# Patient Record
Sex: MALE | Race: WHITE | URBAN - METROPOLITAN AREA
[De-identification: names, ages, dates, MRNs, and addresses within clinical notes are randomized per-mention and may not be internally consistent; named-entity substitution may affect disease eponyms.]

---

## 2017-01-05 LAB — HBA1C MFR BLD HPLC: 5.6 %

## 2017-01-06 ENCOUNTER — OFFICE VISIT (OUTPATIENT)
Dept: ENDOCRINOLOGY | Age: 67
End: 2017-01-06

## 2017-01-06 VITALS
WEIGHT: 268 LBS | HEART RATE: 89 BPM | TEMPERATURE: 98.7 F | RESPIRATION RATE: 16 BRPM | SYSTOLIC BLOOD PRESSURE: 132 MMHG | BODY MASS INDEX: 32.63 KG/M2 | OXYGEN SATURATION: 95 % | DIASTOLIC BLOOD PRESSURE: 72 MMHG | HEIGHT: 76 IN

## 2017-01-06 DIAGNOSIS — I10 ESSENTIAL HYPERTENSION: ICD-10-CM

## 2017-01-06 DIAGNOSIS — G62.9 NEUROPATHY: Primary | ICD-10-CM

## 2017-01-06 DIAGNOSIS — E11.65 TYPE 2 DIABETES MELLITUS WITH HYPERGLYCEMIA, WITHOUT LONG-TERM CURRENT USE OF INSULIN (HCC): Primary | ICD-10-CM

## 2017-01-06 DIAGNOSIS — E11.42 CONTROLLED TYPE 2 DIABETES MELLITUS WITH DIABETIC POLYNEUROPATHY, WITHOUT LONG-TERM CURRENT USE OF INSULIN (HCC): Primary | ICD-10-CM

## 2017-01-06 DIAGNOSIS — G61.81 CIDP (CHRONIC INFLAMMATORY DEMYELINATING POLYNEUROPATHY) (HCC): ICD-10-CM

## 2017-01-06 DIAGNOSIS — G62.9 NEUROPATHY: ICD-10-CM

## 2017-01-06 LAB — GLUCOSE POC: 121 MG/DL

## 2017-01-06 RX ORDER — ALCOHOL 2.38 KG/3.79L
1 GEL TOPICAL 2 TIMES DAILY
Qty: 180 CAP | Refills: 3 | Status: SHIPPED | OUTPATIENT
Start: 2017-01-06 | End: 2019-11-04

## 2017-01-06 RX ORDER — GABAPENTIN 300 MG/1
CAPSULE ORAL
Qty: 450 CAP | Refills: 3 | Status: SHIPPED | OUTPATIENT
Start: 2017-01-06 | End: 2018-01-08 | Stop reason: SDUPTHER

## 2017-01-06 NOTE — PROGRESS NOTES
[facility]               Juhi Carpio MD        [address] 78 [phone] 66 Fax [phone]  MLT-YND) [address] [address] [address] ZZ:[id number] Fax [phone] ( Monday)          Patient Information  Date:1/7/2017  Name : Rochelle Yeung 61 y.o.     YOB: 1950             Chief Complaint   Patient presents with    Diabetes       History of Present Illness: Rochelle Yeung is a 61 y.o. male here for  Fu  of  Type 2 Diabetes Mellitus. He wants to lose weight and try Trulicity   Eating right - on high protein     Diabetes. He is on Bydureon. Metformin    Has not noticed significant hypoglycemia     Compliant with medications      Self-referred for evaluation of diabetes and neuropathy. He was diagnosed with diabetes six years ago. Had  Uncontrolled DM in the past .  In February 2015, he noticed sudden muscle weakness, muscle pain and inability to move. He was on statins at that time which was discontinued and according to the notes from Dr. Layne Wing, he had noted some improvement in the muscle weakness and aches. However, patient states there was no difference after discontinuing statins. He had extensive workup including autoimmune etiology, SPEP, TSH, B-12, connective tissue disorders which were all negative. M-spike was elevated, evaluated by Dr. Shona Clemons and repeat protein electrophoresis was negative, bone scan did not show any lytic lesions. He continues to have muscle fatigue and weakness, pain in the feet. no back ache  Characterization of the symptoms are less specific. The symptoms worsen after a long day of work and after exertion. No connective tissue disorders. Prior to February 2015, he says he was doing fine . No viral infection, no immunization. No upper extremity symptoms but he occasionally has some pain and numbness. He was told he has carpal tunnel syndrome.         Wt Readings from Last 3 Encounters:   01/06/17 268 lb (121.6 kg)   10/05/16 269 lb (122 kg)   05/06/16 266 lb 9.6 oz (120.9 kg)       BP Readings from Last 3 Encounters:   01/06/17 132/72   10/05/16 (!) 136/93   05/06/16 118/72           Past Medical History   Diagnosis Date    CIDP (chronic inflammatory demyelinating polyneuropathy) (HCC)     Diabetes (HCC)     Hypercholesteremia     Hypertension      Current Outpatient Prescriptions   Medication Sig    valsartan (DIOVAN) 320 mg tablet TAKE 1 TAB BY MOUTH DAILY.  metFORMIN (GLUCOPHAGE) 1,000 mg tablet TAKE 1 TABLET BY MOUTH TWICE DAILY    dulaglutide (TRULICITY) 1.5 SG/1.6 mL sub-q pen 0.5 mL by SubCUTAneous route every seven (7) days. Stop Bydureon.  gabapentin (NEURONTIN) 300 mg capsule Take 2 caps in the AM, 1 cap at noon and 2 caps in the evening.  levomefolate-B6-meB12-algal oil (METANX, ALGAL OIL,) 3 mg-35 mg-2 mg -90.314 mg cap Take 1 Cap by mouth two (2) times a day. No current facility-administered medications for this visit. No Known Allergies      Review of Systems:  - Constitutional Symptoms: no fevers, no chills  - Eyes: no blurry vision no double vision  - Cardiovascular: no chest pain ,no palpitations  - Respiratory: no cough no shortness of breath  - Gastrointestinal: no dysphagia no  abdominal pain  - Musculoskeletal:+ joint pains + weakness  - Integumentary: no rashes  - Neurological:+ numbness, tingling, no  headaches  -     Physical Examination:   Blood pressure 132/72, pulse 89, temperature 98.7 °F (37.1 °C), temperature source Oral, resp. rate 16, height 6' 4\" (1.93 m), weight 268 lb (121.6 kg), SpO2 95 %. Estimated body mass index is 32.62 kg/(m^2) as calculated from the following:    Height as of this encounter: 6' 4\" (1.93 m). -   Weight as of this encounter: 268 lb (121.6 kg).   - General: pleasant, no distress, good eye contact  - HEENT: no pallor, no periorbital edema, EOMI  - Neck: supple, no thyromegaly, no nodules  - Cardiovascular: regular, normal rate, normal S1 and S2,  - Respiratory: clear to auscultation bilaterally  - Gastrointestinal: soft, nontender, nondistended,  BS +  - Musculoskeletal: no proximal weakness  - Psychiatric: normal mood and affect  - Skin: color, texture, turgor normal.       Data Reviewed:     [] Glucose records reviewed. [] See glucose records for details (to be scanned). [] A1C  [] Reviewed labs    Lab Results   Component Value Date/Time    Hemoglobin A1c 5.9 10/05/2016 01:32 PM    Glucose 110 10/05/2016 01:32 PM    Glucose  01/06/2017 11:55 AM    Microalb/Creat ratio (ug/mg creat.) 8.4 10/05/2016 01:32 PM    LDL, calculated 123 10/05/2016 01:32 PM    Creatinine 0.72 10/05/2016 01:32 PM    Hemoglobin A1c, External 5.6 01/05/2017      Lab Results   Component Value Date/Time    Cholesterol, total 199 10/05/2016 01:32 PM    HDL Cholesterol 38 10/05/2016 01:32 PM    LDL, calculated 123 10/05/2016 01:32 PM    Triglyceride 189 10/05/2016 01:32 PM         Lab Results   Component Value Date/Time    ALT 27 10/05/2016 01:32 PM    AST 23 10/05/2016 01:32 PM    Alk. phosphatase 54 10/05/2016 01:32 PM    Bilirubin, total 0.4 10/05/2016 01:32 PM     Lab Results   Component Value Date/Time    GFR est  10/05/2016 01:32 PM    GFR est non-AA 99 10/05/2016 01:32 PM    Creatinine 0.72 10/05/2016 01:32 PM    BUN 14 10/05/2016 01:32 PM    Sodium 142 10/05/2016 01:32 PM    Potassium 4.9 10/05/2016 01:32 PM    Chloride 100 10/05/2016 01:32 PM    CO2 25 10/05/2016 01:32 PM      Lab Results   Component Value Date/Time    TSH 1.180 03/13/2015 10:11 AM        Assessment/Plan:     1. Controlled type 2 diabetes mellitus with diabetic polyneuropathy, without long-term current use of insulin ([address]    2. Essential hypertension    3. CIDP (chronic inflammatory demyelinating polyneuropathy) ([address]        1.  Type 2 Diabetes Mellitus with neuropathy  Lab Results   Component Value Date/Time    Hemoglobin A1c 5.9 10/05/2016 01:32 PM    Hemoglobin A1c (POC) 5.3 05/06/2016 02:16 PM    Hemoglobin A1c, External 5.6 01/05/2017     Continue Metformin   Trulicity        FLU annually ,Pneumovax ,aspirin daily,annual eye exam,microalbumin    2. HTN : Continue current therapy     3. Hyperlipidemia : intolerant to statin. ASA    4.Obesity:Body mass index is 32.62 kg/(m^2). Discussed about the importance of exercise and carbohydrate portion control. 5. CIDP - On IV IG   Has M spike and  mild hypercalcemia, bone survey negative  Bone marrow bx was neg     Neuropathy - gabapentin, Metanx       There are no Patient Instructions on file for this visit. Follow-up Disposition:  Return in about 2 months (around 3/6/2017). Thank you for allowing me to participate in the care of this patient.     Yelena Poole MD

## 2017-01-06 NOTE — PROGRESS NOTES
Eye exam: within last year  Foot exam: not within last year    Lab Results   Component Value Date/Time    Hemoglobin A1c 5.9 10/05/2016 01:32 PM    Hemoglobin A1c (POC) 5.3 05/06/2016 02:16 PM     Wt Readings from Last 3 Encounters:   01/06/17 268 lb (121.6 kg)   10/05/16 269 lb (122 kg)   05/06/16 266 lb 9.6 oz (120.9 kg)     Temp Readings from Last 3 Encounters:   01/06/17 98.7 °F (37.1 °C) (Oral)   10/05/16 96.7 °F (35.9 °C) (Oral)   12/04/15 96 °F (35.6 °C) (Oral)     BP Readings from Last 3 Encounters:   01/06/17 132/72   10/05/16 (!) 136/93   05/06/16 118/72     Pulse Readings from Last 3 Encounters:   01/06/17 89   10/05/16 77   05/06/16 88

## 2017-02-15 DIAGNOSIS — I10 ESSENTIAL HYPERTENSION: ICD-10-CM

## 2017-02-16 RX ORDER — METFORMIN HYDROCHLORIDE 1000 MG/1
TABLET ORAL
Qty: 180 TAB | Refills: 8 | Status: SHIPPED | OUTPATIENT
Start: 2017-02-16 | End: 2018-03-15 | Stop reason: SDUPTHER

## 2017-08-04 DIAGNOSIS — E11.65 TYPE 2 DIABETES MELLITUS WITH HYPERGLYCEMIA, WITHOUT LONG-TERM CURRENT USE OF INSULIN (HCC): ICD-10-CM

## 2017-08-07 ENCOUNTER — OFFICE VISIT (OUTPATIENT)
Dept: ENDOCRINOLOGY | Age: 67
End: 2017-08-07

## 2017-08-07 VITALS
HEIGHT: 76 IN | RESPIRATION RATE: 16 BRPM | BODY MASS INDEX: 32.51 KG/M2 | WEIGHT: 267 LBS | SYSTOLIC BLOOD PRESSURE: 140 MMHG | DIASTOLIC BLOOD PRESSURE: 80 MMHG | OXYGEN SATURATION: 93 %

## 2017-08-07 DIAGNOSIS — G61.81 CIDP (CHRONIC INFLAMMATORY DEMYELINATING POLYNEUROPATHY) (HCC): ICD-10-CM

## 2017-08-07 DIAGNOSIS — E11.42 CONTROLLED TYPE 2 DIABETES MELLITUS WITH DIABETIC POLYNEUROPATHY, WITHOUT LONG-TERM CURRENT USE OF INSULIN (HCC): Primary | ICD-10-CM

## 2017-08-07 DIAGNOSIS — I10 ESSENTIAL HYPERTENSION: ICD-10-CM

## 2017-08-07 NOTE — PROGRESS NOTES
Edgar Cano MD           Patient Information  Date:8/7/2017  Name : Kamila Madden 74 y.o.     YOB: 1950             Chief Complaint   Patient presents with    Diabetes       History of Present Illness: Kamila Madden is a 74 y.o. male here for  Fu  of  Type 2 Diabetes Mellitus. He wants to lose weight and try Trulicity   Tried low carb diet , lost weight and gained it back   No exercise   Has muscle cramps    Diabetes. He is on trulicity,  Metformin    Has not noticed significant hypoglycemia     Compliant with medications      Self-referred for evaluation of diabetes and neuropathy. He was diagnosed with diabetes six years ago. Had  Uncontrolled DM in the past .  In February 2015, he noticed sudden muscle weakness, muscle pain and inability to move. He was on statins at that time which was discontinued and according to the notes from Dr. Mann Quinn, he had noted some improvement in the muscle weakness and aches. However, patient states there was no difference after discontinuing statins. He had extensive workup including autoimmune etiology, SPEP, TSH, B-12, connective tissue disorders which were all negative. M-spike was elevated, evaluated by Dr. Ashanti Griffin and repeat protein electrophoresis was negative, bone scan did not show any lytic lesions. He continues to have muscle fatigue and weakness, pain in the feet. no back ache  Characterization of the symptoms are less specific. The symptoms worsen after a long day of work and after exertion. No connective tissue disorders. Prior to February 2015, he says he was doing fine . No viral infection, no immunization. No upper extremity symptoms but he occasionally has some pain and numbness. He was told he has carpal tunnel syndrome.         Wt Readings from Last 3 Encounters:   08/07/17 267 lb (121.1 kg)   01/06/17 268 lb (121.6 kg)   10/05/16 269 lb (122 kg)       BP Readings from Last 3 Encounters:   08/07/17 140/80   01/06/17 132/72   10/05/16 (!) 136/93           Past Medical History:   Diagnosis Date    CIDP (chronic inflammatory demyelinating polyneuropathy) (HCC)     Diabetes (HCC)     Hypercholesteremia     Hypertension      Current Outpatient Prescriptions   Medication Sig    dulaglutide (TRULICITY) 1.5 HV/3.8 mL sub-q pen 0.5 mL by SubCUTAneous route every seven (7) days. Stop Bydureon.  metFORMIN (GLUCOPHAGE) 1,000 mg tablet TAKE 1 TABLET BY MOUTH TWICE DAILY    gabapentin (NEURONTIN) 300 mg capsule Take 2 caps in the AM, 1 cap at noon and 2 caps in the evening.  levomefolate-B6-meB12-algal oil (METANX, ALGAL OIL,) 3 mg-35 mg-2 mg -90.314 mg cap Take 1 Cap by mouth two (2) times a day.  valsartan (DIOVAN) 320 mg tablet TAKE 1 TAB BY MOUTH DAILY.  metFORMIN (GLUCOPHAGE) 1,000 mg tablet TAKE 1 TABLET BY MOUTH TWICE DAILY     No current facility-administered medications for this visit. No Known Allergies      Review of Systems:  - Constitutional Symptoms: no fevers, no chills  - Eyes: no blurry vision no double vision  - Cardiovascular: no chest pain ,no palpitations  - Respiratory: no cough no shortness of breath  - Gastrointestinal: no dysphagia no  abdominal pain  - Musculoskeletal:+ joint pains + weakness  - Integumentary: no rashes  - Neurological:+ numbness, tingling, no  headaches  -     Physical Examination:   Blood pressure 140/80, resp. rate 16, height 6' 4\" (1.93 m), weight 267 lb (121.1 kg), SpO2 93 %. Estimated body mass index is 32.5 kg/(m^2) as calculated from the following:    Height as of this encounter: 6' 4\" (1.93 m). -   Weight as of this encounter: 267 lb (121.1 kg).   - General: pleasant, no distress, good eye contact  - HEENT: no pallor, no periorbital edema, EOMI  - Neck: supple, no thyromegaly, no nodules  - Cardiovascular: regular, normal rate, normal S1 and S2,  - Respiratory: clear to auscultation bilaterally  - Gastrointestinal: soft, nontender, nondistended,  BS +  - Musculoskeletal: no proximal weakness  - Psychiatric: normal mood and affect  - Skin: color, texture, turgor normal.       Data Reviewed:     [] Glucose records reviewed. [] See glucose records for details (to be scanned). [] A1C  [] Reviewed labs    Lab Results   Component Value Date/Time    Hemoglobin A1c 5.9 10/05/2016 01:32 PM    Glucose 110 10/05/2016 01:32 PM    Glucose  01/06/2017 11:55 AM    Microalb/Creat ratio (ug/mg creat.) 8.4 10/05/2016 01:32 PM    LDL, calculated 123 10/05/2016 01:32 PM    Creatinine 0.72 10/05/2016 01:32 PM    Hemoglobin A1c, External 5.6 01/05/2017      Lab Results   Component Value Date/Time    Cholesterol, total 199 10/05/2016 01:32 PM    HDL Cholesterol 38 10/05/2016 01:32 PM    LDL, calculated 123 10/05/2016 01:32 PM    Triglyceride 189 10/05/2016 01:32 PM         Lab Results   Component Value Date/Time    ALT (SGPT) 27 10/05/2016 01:32 PM    AST (SGOT) 23 10/05/2016 01:32 PM    Alk. phosphatase 54 10/05/2016 01:32 PM    Bilirubin, total 0.4 10/05/2016 01:32 PM     Lab Results   Component Value Date/Time    GFR est  10/05/2016 01:32 PM    GFR est non-AA 99 10/05/2016 01:32 PM    Creatinine 0.72 10/05/2016 01:32 PM    BUN 14 10/05/2016 01:32 PM    Sodium 142 10/05/2016 01:32 PM    Potassium 4.9 10/05/2016 01:32 PM    Chloride 100 10/05/2016 01:32 PM    CO2 25 10/05/2016 01:32 PM      Lab Results   Component Value Date/Time    TSH 1.180 03/13/2015 10:11 AM        Assessment/Plan:     No diagnosis found. 1. Type 2 Diabetes Mellitus with neuropathy  Lab Results   Component Value Date/Time    Hemoglobin A1c 5.9 10/05/2016 01:32 PM    Hemoglobin A1c (POC) 5.3 05/06/2016 02:16 PM    Hemoglobin A1c, External 5.6 01/05/2017     Continue Metformin   Trulicity    Need low carb diet and lose weight     FLU annually ,Pneumovax ,aspirin daily,annual eye exam,microalbumin    2. HTN : Continue current therapy     3.  Hyperlipidemia : intolerant to statin. ASA    4.Obesity:Body mass index is 32.5 kg/(m^2). Discussed about the importance of exercise and carbohydrate portion control. 5. CIDP - On IV IG   Has M spike and  mild hypercalcemia, bone survey negative  Bone marrow bx was neg     6 Neuropathy - gabapentin, Metanx, cramps related CIDP ? Has tried K , mag, calcium , Vit D   History not typical of PAD, discussed about ABAD       There are no Patient Instructions on file for this visit. Follow-up Disposition: Not on File    Thank you for allowing me to participate in the care of this patient.     Opal Carrera MD

## 2017-08-07 NOTE — PROGRESS NOTES
Lab Results   Component Value Date/Time    Hemoglobin A1c 5.9 10/05/2016 01:32 PM    Hemoglobin A1c (POC) 5.3 05/06/2016 02:16 PM    Hemoglobin A1c, External 5.6 01/05/2017     Wt Readings from Last 3 Encounters:   08/07/17 267 lb (121.1 kg)   01/06/17 268 lb (121.6 kg)   10/05/16 269 lb (122 kg)     Temp Readings from Last 3 Encounters:   01/06/17 98.7 °F (37.1 °C) (Oral)   10/05/16 96.7 °F (35.9 °C) (Oral)   12/04/15 96 °F (35.6 °C) (Oral)     BP Readings from Last 3 Encounters:   08/07/17 140/80   01/06/17 132/72   10/05/16 (!) 136/93     Pulse Readings from Last 3 Encounters:   01/06/17 89   10/05/16 77   05/06/16 88

## 2017-12-06 RX ORDER — VALSARTAN 320 MG/1
TABLET ORAL
Qty: 90 TAB | Refills: 0 | Status: SHIPPED | OUTPATIENT
Start: 2017-12-06 | End: 2018-03-20 | Stop reason: SDUPTHER

## 2018-03-15 ENCOUNTER — HOSPITAL ENCOUNTER (OUTPATIENT)
Dept: LAB | Age: 68
Discharge: HOME | End: 2018-03-15
Payer: MEDICARE

## 2018-03-15 ENCOUNTER — OFFICE VISIT (OUTPATIENT)
Dept: ENDOCRINOLOGY | Age: 68
End: 2018-03-15

## 2018-03-15 VITALS
BODY MASS INDEX: 31.54 KG/M2 | TEMPERATURE: 95.9 F | HEART RATE: 95 BPM | WEIGHT: 259 LBS | HEIGHT: 76 IN | RESPIRATION RATE: 14 BRPM | OXYGEN SATURATION: 92 % | DIASTOLIC BLOOD PRESSURE: 97 MMHG | SYSTOLIC BLOOD PRESSURE: 145 MMHG

## 2018-03-15 DIAGNOSIS — E11.42 CONTROLLED TYPE 2 DIABETES MELLITUS WITH DIABETIC POLYNEUROPATHY, WITHOUT LONG-TERM CURRENT USE OF INSULIN (HCC): Primary | ICD-10-CM

## 2018-03-15 DIAGNOSIS — G61.81 CIDP (CHRONIC INFLAMMATORY DEMYELINATING POLYNEUROPATHY) (HCC): ICD-10-CM

## 2018-03-15 DIAGNOSIS — I10 ESSENTIAL HYPERTENSION: ICD-10-CM

## 2018-03-15 LAB
GLUCOSE POC: 115 MG/DL
HBA1C MFR BLD HPLC: 5.2 %

## 2018-03-15 PROCEDURE — 82043 UR ALBUMIN QUANTITATIVE: CPT

## 2018-03-15 PROCEDURE — 80048 BASIC METABOLIC PNL TOTAL CA: CPT

## 2018-03-15 PROCEDURE — 36415 COLL VENOUS BLD VENIPUNCTURE: CPT

## 2018-03-15 PROCEDURE — 80061 LIPID PANEL: CPT

## 2018-03-15 NOTE — PROGRESS NOTES
Terry Davila MD           Patient Information  Date:3/15/2018  Name : Delta Cantu 74 y.o.     YOB: 1950             Chief Complaint   Patient presents with    Diabetes       History of Present Illness: Delta Cantu is a 74 y.o. male here for  Fu  of  Type 2 Diabetes Mellitus. Diabetes. He is on trulicity,  Metformin   StoppedTrulicity for a while due to the expense, gained 12 pounds and hence he is resumed. Has not noticed significant hypoglycemia   He is compliant with the medications  Metanx did not help      Self-referred for evaluation of diabetes and neuropathy. He was diagnosed with diabetes six years ago. Had  Uncontrolled DM in the past .  In February 2015, he noticed sudden muscle weakness, muscle pain and inability to move. He was on statins at that time which was discontinued ,  there was no difference after discontinuing statins. He had extensive workup including autoimmune etiology, SPEP, TSH, B-12, connective tissue disorders which were all negative. M-spike was elevated, evaluated by Dr. Emperatriz Traore and repeat protein electrophoresis was negative, bone scan did not show any lytic lesions.       He went to [facility] and was dx with CIDP , on IV IG       Wt Readings from Last 3 Encounters:   03/15/18 259 lb (117.5 kg)   08/07/17 267 lb (121.1 kg)   01/06/17 268 lb (121.6 kg)       BP Readings from Last 3 Encounters:   03/15/18 (!) 145/97   08/07/17 140/80   01/06/17 132/72           Past Medical History:   Diagnosis Date    CIDP (chronic inflammatory demyelinating polyneuropathy) (HCC)     Diabetes (HCC)     Hypercholesteremia     Hypertension      Current Outpatient Prescriptions   Medication Sig    gabapentin (NEURONTIN) 300 mg capsule TAKE 2 CAPSULES BY MOUTH EVERY MORNING, 1 CAPSULE AT NOON, AND 2 CAPSULES EVERY EVENING (Patient taking differently: TAKE 2 CAPSULES BY MOUTH EVERY MORNING AND 2 CAPSULES EVERY EVENING)    valsartan (DIOVAN) 320 mg tablet TAKE 1 TABLET BY MOUTH DAILY    dulaglutide (TRULICITY) 1.5 KW/8.4 mL sub-q pen 0.5 mL by SubCUTAneous route every seven (7) days. Stop Bydureon.  metFORMIN (GLUCOPHAGE) 1,000 mg tablet TAKE 1 TABLET BY MOUTH TWICE DAILY    levomefolate-B6-meB12-algal oil (METANX, ALGAL OIL,) 3 mg-35 mg-2 mg -90.314 mg cap Take 1 Cap by mouth two (2) times a day. No current facility-administered medications for this visit. No Known Allergies      Review of Systems:  - Constitutional Symptoms: no fevers, no chills  - Eyes: no blurry vision no double vision  - Cardiovascular: no chest pain ,no palpitations  - Respiratory: no cough no shortness of breath  - Gastrointestinal: no dysphagia no  abdominal pain  - Musculoskeletal:+ joint pains + weakness  - Integumentary: no rashes  - Neurological:+ numbness, tingling, no  headaches  -     Physical Examination:   Blood pressure (!) 145/97, pulse 95, temperature 95.9 °F (35.5 °C), temperature source Oral, resp. rate 14, height 6' 4\" (1.93 m), weight 259 lb (117.5 kg), SpO2 92 %. Estimated body mass index is 31.53 kg/(m^2) as calculated from the following:    Height as of this encounter: 6' 4\" (1.93 m). -   Weight as of this encounter: 259 lb (117.5 kg).   - General: pleasant, no distress, good eye contact  - HEENT: no pallor, no periorbital edema, EOMI  - Neck: supple, no thyromegaly, no nodules  - Cardiovascular: regular, normal rate, normal S1 and S2,  - Respiratory: clear to auscultation bilaterally  - Gastrointestinal: soft, nontender, nondistended,  BS +  - Musculoskeletal: no proximal weakness  - Psychiatric: normal mood and affect  - Skin: color, texture, turgor normal.     Diabetic foot exam: March 2018    Left:     Vibratory sensation absent   Filament test decreased sensation with micro filament   Pulse DP: 1+    Deformities: None  Right:    Vibratory sensation absent   Filament test decreased sensation with micro filament   Pulse DP: 1+   Deformities: None    Data Reviewed:     [] Glucose records reviewed. [] See glucose records for details (to be scanned). [] A1C  [] Reviewed labs    Lab Results   Component Value Date/Time    Hemoglobin A1c 5.9 (H) 10/05/2016 01:32 PM    Glucose 110 (H) 10/05/2016 01:32 PM    Glucose  03/15/2018 10:03 AM    Microalb/Creat ratio (ug/mg creat.) 8.4 10/05/2016 01:32 PM    LDL, calculated 123 (H) 10/05/2016 01:32 PM    Creatinine 0.72 (L) 10/05/2016 01:32 PM    Hemoglobin A1c, External 5.6 01/05/2017      Lab Results   Component Value Date/Time    Cholesterol, total 199 10/05/2016 01:32 PM    HDL Cholesterol 38 (L) 10/05/2016 01:32 PM    LDL, calculated 123 (H) 10/05/2016 01:32 PM    Triglyceride 189 (H) 10/05/2016 01:32 PM         Lab Results   Component Value Date/Time    ALT (SGPT) 27 10/05/2016 01:32 PM    AST (SGOT) 23 10/05/2016 01:32 PM    Alk. phosphatase 54 10/05/2016 01:32 PM    Bilirubin, total 0.4 10/05/2016 01:32 PM     Lab Results   Component Value Date/Time    GFR est  10/05/2016 01:32 PM    GFR est non-AA 99 10/05/2016 01:32 PM    Creatinine 0.72 (L) 10/05/2016 01:32 PM    BUN 14 10/05/2016 01:32 PM    Sodium 142 10/05/2016 01:32 PM    Potassium 4.9 10/05/2016 01:32 PM    Chloride 100 10/05/2016 01:32 PM    CO2 25 10/05/2016 01:32 PM      Lab Results   Component Value Date/Time    TSH 1.180 03/13/2015 10:11 AM        Assessment/Plan:     1. Controlled type 2 diabetes mellitus with diabetic polyneuropathy, without long-term current use of insulin ([address]    2. Essential hypertension        1. Type 2 Diabetes Mellitus with neuropathy  Lab Results   Component Value Date/Time    Hemoglobin A1c 5.9 (H) 10/05/2016 01:32 PM    Hemoglobin A1c (POC) 5.2 03/15/2018 10:04 AM    Hemoglobin A1c, External 5.6 01/05/2017     Continue Metformin   Trulicity    Need low carb diet and lose weight     FLU annually ,Pneumovax ,aspirin daily,annual eye exam,microalbumin    2.   HTN : Continue current therapy     3. Hyperlipidemia : Discussed about low-dose statin, history of intolerance  Discussed the benefits    4. Obesity:Body mass index is 31.53 kg/(m^2). Discussed about the importance of exercise and carbohydrate portion control. 5. CIDP - On IV IG , once a month  Has M spike and  mild hypercalcemia, bone survey negative  Bone marrow bx was neg     6 Neuropathy - has tried gabapentin, Metanx, cramps related CIDP ? Has tried K , mag, calcium , Vit D   History not typical of PAD,        There are no Patient Instructions on file for this visit. Follow-up Disposition: Not on File    Thank you for allowing me to participate in the care of this patient.     Nir Dash MD

## 2018-03-15 NOTE — MR AVS SNAPSHOT
49 [facility] [address] 
[phone] Patient: Naomi Giron MRN: [id number] TUP:3/48/5279 Visit Information Date & Time Provider Department Dept. Phone Encounter #  
 3/15/2018  9:45 AM Adrian Cao MD [facility] [facility] Endocr[facility]ology [phone] [id number] Follow-up Instructions Return in about 6 months (around 9/15/2018). Upcoming Health Maintenance Date Due Hepatitis C Screening 1950 FOOT EXAM Q1 4/19/1962 EYE EXAM RETINAL OR DILATED Q1 4/19/1962 DTaP/Tdap/Td series (1 - Tdap) 4/19/1973 FOBT Q 1 YEAR AGE 50-75 4/19/2002 ZOSTER VACCINE AGE 60> 2/19/2012 GLAUCOMA SCREENING Q2Y 4/19/2017 Pneumococcal 65+ Low/Medium Risk (1 of 2 - PCV13) 4/19/2017 MEDICARE YEARLY EXAM 4/19/2017 Influenza Age 5 to Adult 8/1/2017 MICROALBUMIN Q1 10/5/2017 HEMOGLOBIN A1C Q6M 11/13/2017 LIPID PANEL Q1 5/13/2018 Allergies as of 3/15/2018  Review Complete On: 3/15/2018 By: Adrian Cao MD  
 No Known Allergies Current Immunizations  Never Reviewed No immunizations on file. Not reviewed this visit You Were Diagnosed With   
  
 Codes Comments Controlled type 2 diabetes mellitus with diabetic polyneuropathy, without long-term current use of insulin (HCC)    -  Primary ICD-10-CM: E11.42 
ICD-9-CM: 250.60, 357.2 Essential hypertension     ICD-10-CM: I10 
ICD-9-CM: 401.9 Vitals BP Pulse Temp Resp Height(growth percentile) Weight(growth percentile) (!) 145/97 (BP 1 Location: Right arm, BP Patient Position: Sitting) 95 95.9 °F (35.5 °C) (Oral) 14 6' 4\" (1.93 m) 259 lb (117.5 kg) SpO2 BMI Smoking Status 92% 31.53 kg/m2 Former Smoker Vitals History BMI and BSA Data Body Mass Index Body Surface Area  
 31.53 kg/m 2 2.51 m 2 Preferred Pharmacy Pharmacy Name Phone [facility] [address] [place] [address],  Conchis Mckinley [address] [place] [address] [phone] Your Updated Medication List  
  
   
This list is accurate as of 3/15/18 10:22 AM.  Always use your most recent med list.  
  
  
  
  
 dulaglutide 1.5 mg/0.5 mL sub-q pen Commonly known as:  TRULICITY  
0.5 mL by SubCUTAneous route every seven (7) days. Stop Bydureon. gabapentin 300 mg capsule Commonly known as:  NEURONTIN  
TAKE 2 CAPSULES BY MOUTH EVERY MORNING, 1 CAPSULE AT NOON, AND 2 CAPSULES EVERY EVENING  
  
 levomefolate-B6-meB12-algal oil 3 mg-35 mg-2 mg -90.314 mg Cap Commonly known as:  METANX (ALGAL OIL) Take 1 Cap by mouth two (2) times a day. metFORMIN 1,000 mg tablet Commonly known as:  GLUCOPHAGE  
TAKE 1 TABLET BY MOUTH TWICE DAILY  
  
 valsartan 320 mg tablet Commonly known as:  DIOVAN  
TAKE 1 TABLET BY MOUTH DAILY We Performed the Following AMB POC GLUCOSE, QUANTITATIVE, BLOOD [03623 CPT(R)] AMB POC HEMOGLOBIN A1C [04402 CPT(R)] LIPID PANEL [50999 CPT(R)] METABOLIC PANEL, BASIC [51150 CPT(R)] MICROALBUMIN, UR, RAND W/ MICROALB/CREAT RATIO [id number] CPT(R)] Follow-up Instructions Return in about 6 months (around 9/15/2018). Introducing [facility]! Kaylie Crockett introduces Stitcher patient portal. Now you can access parts of your medical record, email your doctor's office, and request medication refills online. 1. In your internet browser, go to [email or web address] 2. Click on the First Time User? Click Here link in the Sign In box. You will see the New Member Sign Up page. 3. Enter your Stitcher Access Code exactly as it appears below. You will not need to use this code after youve completed the sign-up process. If you do not sign up before the expiration date, you must request a new code. · Stitcher Access Code: [email or web address] Expires: 6/13/2018 10:22 AM 
 
 4. Enter the last four digits of your Social Security Number (xxxx) and Date of Birth (mm/dd/yyyy) as indicated and click Submit. You will be taken to the next sign-up page. 5. Create a Agg[employer]ate Knowledge ID. This will be your Agg[employer]ate Knowledge login ID and cannot be changed, so think of one that is secure and easy to remember. 6. Create a Agg[employer] Knowledge password. You can change your password at any time. 7. Enter your Password Reset Question and Answer. This can be used at a later time if you forget your password. 8. Enter your e-mail address. You will receive e-mail notification when new information is available in [address] 9. Click Sign Up. You can now view and download portions of your medical record. 10. Click the Download Summary menu link to download a portable copy of your medical information. If you have questions, please visit the Frequently Asked Questions section of the [employer] website. Remember, [employer] is NOT to be used for urgent needs. For medical emergencies, dial 911. Now available from your iPhone and Android! Please provide this summary of care documentation to your next provider. Your primary care clinician is listed as Ana Rod. If you have any questions after today's visit, please call [phone].

## 2018-03-15 NOTE — PROGRESS NOTES
Azeem Beatty is a 74 y.o. male here for   Chief Complaint   Patient presents with    Diabetes       Functional glucose monitor and record keeping system? - not really checking  Eye exam within last year? - [address] [place]   Foot exam within last year? - due    1. Have you been to the ER, urgent care clinic since your last visit? Hospitalized since your last visit? -no    2. Have you seen or consulted any other health care providers outside of the [address] [place] [address] since your last visit?   Include any pap smears or colon screening.- IBIG      Lab Results   Component Value Date/Time    Hemoglobin A1c 5.9 (H) 10/05/2016 01:32 PM    Hemoglobin A1c (POC) 5.3 05/06/2016 02:16 PM    Hemoglobin A1c, External 5.6 01/05/2017       Wt Readings from Last 3 Encounters:   08/07/17 267 lb (121.1 kg)   01/06/17 268 lb (121.6 kg)   10/05/16 269 lb (122 kg)     Temp Readings from Last 3 Encounters:   01/06/17 98.7 °F (37.1 °C) (Oral)   10/05/16 96.7 °F (35.9 °C) (Oral)   12/04/15 96 °F (35.6 °C) (Oral)     BP Readings from Last 3 Encounters:   08/07/17 140/80   01/06/17 132/72   10/05/16 (!) 136/93     Pulse Readings from Last 3 Encounters:   01/06/17 89   10/05/16 77   05/06/16 88

## 2018-03-16 LAB
ALBUMIN/CREAT UR: 32.6 MG/G CREAT (ref 0–30)
BUN SERPL-MCNC: 15 MG/DL (ref 8–27)
BUN/CREAT SERPL: 18 (ref 10–24)
CALCIUM SERPL-MCNC: 10.1 MG/DL (ref 8.6–10.2)
CHLORIDE SERPL-SCNC: 100 MMOL/L (ref 96–106)
CHOLEST SERPL-MCNC: 189 MG/DL (ref 100–199)
CO2 SERPL-SCNC: 27 MMOL/L (ref 18–29)
CREAT SERPL-MCNC: 0.82 MG/DL (ref 0.76–1.27)
CREAT UR-MCNC: 106.6 MG/DL
GFR SERPLBLD CREATININE-BSD FMLA CKD-EPI: 107 ML/MIN/1.73
GFR SERPLBLD CREATININE-BSD FMLA CKD-EPI: 93 ML/MIN/1.73
GLUCOSE SERPL-MCNC: 109 MG/DL (ref 65–99)
HDLC SERPL-MCNC: 37 MG/DL
INTERPRETATION, 910389: NORMAL
LDLC SERPL CALC-MCNC: 129 MG/DL (ref 0–99)
Lab: NORMAL
MICROALBUMIN UR-MCNC: 34.7 UG/ML
POTASSIUM SERPL-SCNC: 4.6 MMOL/L (ref 3.5–5.2)
SODIUM SERPL-SCNC: 140 MMOL/L (ref 134–144)
TRIGL SERPL-MCNC: 115 MG/DL (ref 0–149)
VLDLC SERPL CALC-MCNC: 23 MG/DL (ref 5–40)

## 2018-03-18 DIAGNOSIS — E11.49 TYPE II OR UNSPECIFIED TYPE DIABETES MELLITUS WITH NEUROLOGICAL MANIFESTATIONS, NOT STATED AS UNCONTROLLED(250.60) (HCC): Primary | ICD-10-CM

## 2018-03-18 RX ORDER — PRAVASTATIN SODIUM 10 MG/1
10 TABLET ORAL
Qty: 30 TAB | Refills: 5 | Status: SHIPPED | OUTPATIENT
Start: 2018-03-18 | End: 2018-03-18 | Stop reason: SDUPTHER

## 2018-08-02 DIAGNOSIS — I10 ESSENTIAL HYPERTENSION: Primary | ICD-10-CM

## 2018-08-03 RX ORDER — LOSARTAN POTASSIUM 100 MG/1
100 TABLET ORAL DAILY
Qty: 90 TAB | Refills: 3 | Status: SHIPPED | OUTPATIENT
Start: 2018-08-03 | End: 2018-11-26 | Stop reason: SDUPTHER

## 2018-09-10 ENCOUNTER — HOSPITAL ENCOUNTER (OUTPATIENT)
Dept: LAB | Age: 68
Discharge: HOME | End: 2018-09-10
Payer: MEDICARE

## 2018-09-10 PROCEDURE — 80061 LIPID PANEL: CPT

## 2018-09-10 PROCEDURE — 80053 COMPREHEN METABOLIC PANEL: CPT

## 2018-09-10 PROCEDURE — 82043 UR ALBUMIN QUANTITATIVE: CPT

## 2018-09-10 PROCEDURE — 83036 HEMOGLOBIN GLYCOSYLATED A1C: CPT

## 2018-09-10 PROCEDURE — 36415 COLL VENOUS BLD VENIPUNCTURE: CPT

## 2018-09-17 ENCOUNTER — OFFICE VISIT (OUTPATIENT)
Dept: ENDOCRINOLOGY | Age: 68
End: 2018-09-17

## 2018-09-17 VITALS
BODY MASS INDEX: 31.78 KG/M2 | WEIGHT: 261 LBS | DIASTOLIC BLOOD PRESSURE: 95 MMHG | OXYGEN SATURATION: 95 % | RESPIRATION RATE: 16 BRPM | TEMPERATURE: 95.9 F | HEIGHT: 76 IN | HEART RATE: 87 BPM | SYSTOLIC BLOOD PRESSURE: 145 MMHG

## 2018-09-17 DIAGNOSIS — I10 ESSENTIAL HYPERTENSION: ICD-10-CM

## 2018-09-17 DIAGNOSIS — E78.5 HYPERLIPIDEMIA, UNSPECIFIED HYPERLIPIDEMIA TYPE: ICD-10-CM

## 2018-09-17 DIAGNOSIS — E11.65 TYPE 2 DIABETES MELLITUS WITH HYPERGLYCEMIA, WITHOUT LONG-TERM CURRENT USE OF INSULIN (HCC): Primary | ICD-10-CM

## 2018-09-17 DIAGNOSIS — E78.2 MIXED HYPERLIPIDEMIA: ICD-10-CM

## 2018-09-17 DIAGNOSIS — E78.5 HYPERLIPIDEMIA, UNSPECIFIED HYPERLIPIDEMIA TYPE: Primary | ICD-10-CM

## 2018-09-17 PROBLEM — E11.21 TYPE 2 DIABETES WITH NEPHROPATHY (HCC): Status: ACTIVE | Noted: 2018-09-17

## 2018-09-17 RX ORDER — EZETIMIBE 10 MG/1
10 TABLET ORAL
Qty: 90 TAB | Refills: 3 | Status: SHIPPED | OUTPATIENT
Start: 2018-09-17 | End: 2018-12-14 | Stop reason: SDUPTHER

## 2018-09-17 RX ORDER — AMLODIPINE BESYLATE 5 MG/1
TABLET ORAL
Refills: 2 | COMMUNITY
Start: 2018-08-31 | End: 2019-11-04

## 2018-09-17 RX ORDER — EZETIMIBE 10 MG/1
10 TABLET ORAL
Qty: 90 TAB | Refills: 3 | Status: SHIPPED | OUTPATIENT
Start: 2018-09-17 | End: 2018-09-17 | Stop reason: SDUPTHER

## 2018-09-17 NOTE — PROGRESS NOTES
Jeannine Merritt is a 79 y.o. male here for   Chief Complaint   Patient presents with    Diabetes       Functional glucose monitor and record keeping system? - not checking  Eye exam within last year? - [address] [place]  Foot exam within last year? - on file    1. Have you been to the ER, urgent care clinic since your last visit? Hospitalized since your last visit? -no    2. Have you seen or consulted any other health care providers outside of the [address] [place] [address] since your last visit? Include any pap smears or colon screening. -PCP

## 2018-09-17 NOTE — PROGRESS NOTES
Helio Mcgarry MD           Patient Information  Date:9/17/2018  Name : Manny Erazo 79 y.o.     YOB: 1950             Chief Complaint   Patient presents with    Diabetes       History of Present Illness: Manny Erazo is a 79 y.o. male here for  Fu  of  Type 2 Diabetes Mellitus. Diabetes: He is on metformin, Trulicity was expensive and hence he has stopped it. He has gained weight after stopping Trulicity  Not able to do much activity because of the CIDP  History of statin intolerance, LDL is high  Diet could be better    Has not noticed significant hypoglycemia   He is compliant with the medications  Metanx did not help      Self-referred for evaluation of diabetes and neuropathy. He was diagnosed with diabetes six years ago. Had  Uncontrolled DM in the past .  In February 2015, he noticed sudden muscle weakness, muscle pain and inability to move. He was on statins at that time which was discontinued ,  there was no difference after discontinuing statins. He had extensive workup including autoimmune etiology, SPEP, TSH, B-12, connective tissue disorders which were all negative. M-spike was elevated, evaluated by Dr. Jennifer Romero and repeat protein electrophoresis was negative, bone scan did not show any lytic lesions.       He went to [facility] and was dx with CIDP , on IV IG       Wt Readings from Last 3 Encounters:   09/17/18 261 lb (118.4 kg)   03/15/18 259 lb (117.5 kg)   08/07/17 267 lb (121.1 kg)       BP Readings from Last 3 Encounters:   09/17/18 (!) 145/95   03/15/18 (!) 145/97   08/07/17 140/80           Past Medical History:   Diagnosis Date    CIDP (chronic inflammatory demyelinating polyneuropathy) (HCC)     Diabetes (HCC)     Hypercholesteremia     Hypertension      Current Outpatient Prescriptions   Medication Sig    amLODIPine (NORVASC) 5 mg tablet TAKE 1 TABLET BY MOUTH EVERY DAY    losartan (COZAAR) 100 mg tablet Take 1 Tab by mouth daily. STOP VALSARTAN    metFORMIN (GLUCOPHAGE) 1,000 mg tablet TAKE 1 TABLET BY MOUTH TWICE DAILY    pravastatin (PRAVACHOL) 10 mg tablet TAKE 1 TABLET BY MOUTH EVERY NIGHT FOR HIGH CHOLESTEROL    gabapentin (NEURONTIN) 300 mg capsule TAKE 2 CAPSULES BY MOUTH EVERY MORNING, 1 CAPSULE AT NOON, AND 2 CAPSULES EVERY EVENING (Patient taking differently: TAKE 2 CAPSULES BY MOUTH EVERY MORNING AND 2 CAPSULES EVERY EVENING)    dulaglutide (TRULICITY) 1.5 HB/1.1 mL sub-q pen 0.5 mL by SubCUTAneous route every seven (7) days. Stop Bydureon.  levomefolate-B6-meB12-algal oil (METANX, ALGAL OIL,) 3 mg-35 mg-2 mg -90.314 mg cap Take 1 Cap by mouth two (2) times a day. No current facility-administered medications for this visit. No Known Allergies      Review of Systems:  - Constitutional Symptoms: no fevers, no chills  - Eyes: no blurry vision no double vision  - Cardiovascular: no chest pain ,no palpitations  - Respiratory: no cough no shortness of breath  - Gastrointestinal: no dysphagia no  abdominal pain  - Musculoskeletal:+ joint pains + weakness  - Integumentary: no rashes  - Neurological:+ numbness, tingling, no  headaches  -     Physical Examination:   Blood pressure (!) 145/95, pulse 87, temperature 95.9 °F (35.5 °C), temperature source Oral, resp. rate 16, height 6' 4\" (1.93 m), weight 261 lb (118.4 kg), SpO2 95 %. Estimated body mass index is 31.77 kg/(m^2) as calculated from the following:    Height as of this encounter: 6' 4\" (1.93 m). -   Weight as of this encounter: 261 lb (118.4 kg).   - General: pleasant, no distress, good eye contact  - HEENT: no pallor, no periorbital edema, EOMI  - Neck: supple, no thyromegaly, no nodules  - Cardiovascular: regular, normal rate, normal S1 and S2,  - Respiratory: clear to auscultation bilaterally  - Gastrointestinal: soft, nontender, nondistended,  BS +  - Musculoskeletal: no proximal weakness  - Psychiatric: normal mood and affect  - Skin: color, texture, turgor normal.     Diabetic foot exam: March 2018    Left:     Vibratory sensation absent   Filament test decreased sensation with micro filament   Pulse DP: 1+    Deformities: None  Right:    Vibratory sensation absent   Filament test decreased sensation with micro filament   Pulse DP: 1+   Deformities: None    Data Reviewed:     [] Glucose records reviewed. [] See glucose records for details (to be scanned). [] A1C  [] Reviewed labs    Lab Results   Component Value Date/Time    Hemoglobin A1c 5.7 (H) 09/10/2018 01:23 PM    Hemoglobin A1c 5.9 (H) 10/05/2016 01:32 PM    Glucose 113 (H) 09/10/2018 01:23 PM    Glucose  03/15/2018 10:03 AM    Microalb/Creat ratio (ug/mg creat.) 30.9 (H) 09/10/2018 01:23 PM    LDL, calculated 160 (H) 09/10/2018 01:23 PM    Creatinine 0.92 09/10/2018 01:23 PM    Hemoglobin A1c, External 5.6 01/05/2017      Lab Results   Component Value Date/Time    Cholesterol, total 229 (H) 09/10/2018 01:23 PM    HDL Cholesterol 39 (L) 09/10/2018 01:23 PM    LDL, calculated 160 (H) 09/10/2018 01:23 PM    Triglyceride 148 09/10/2018 01:23 PM         Lab Results   Component Value Date/Time    ALT (SGPT) 29 09/10/2018 01:23 PM    AST (SGOT) 23 09/10/2018 01:23 PM    Alk. phosphatase 50 09/10/2018 01:23 PM    Bilirubin, total 0.4 09/10/2018 01:23 PM     Lab Results   Component Value Date/Time    GFR est  09/10/2018 01:23 PM    GFR est non-AA 86 09/10/2018 01:23 PM    Creatinine 0.92 09/10/2018 01:23 PM    BUN 11 09/10/2018 01:23 PM    Sodium 142 09/10/2018 01:23 PM    Potassium 5.0 09/10/2018 01:23 PM    Chloride 102 09/10/2018 01:23 PM    CO2 27 09/10/2018 01:23 PM      Lab Results   Component Value Date/Time    TSH 1.180 03/13/2015 10:11 AM        Assessment/Plan:     1. Type 2 diabetes mellitus with hyperglycemia, without long-term current use of insulin ([address]    2. Essential hypertension        1.  Type 2 Diabetes Mellitus with neuropathy  Lab Results   Component Value Date/Time Hemoglobin A1c 5.7 (H) 09/10/2018 01:23 PM    Hemoglobin A1c (POC) 5.2 03/15/2018 10:04 AM    Hemoglobin A1c, External 5.6 01/05/2017     Continue Metformin   Off Trulicity  Need low carb diet and lose weight     FLU annually ,Pneumovax ,aspirin daily,annual eye exam,microalbumin    2. HTN : Continue current therapy     3. Hyperlipidemia : Discussed about low-dose statin due to history of statin intolerance he is hesitant to start  Understands the benefits of statin  Zetia    4. Obesity:Body mass index is 31.77 kg/(m^2). Discussed about the importance of exercise and carbohydrate portion control. 5. CIDP - On IV IG , once a month  Has M spike and  mild hypercalcemia, bone survey negative  Bone marrow bx was neg       6 Neuropathy - has tried gabapentin, Metanx, cramps related CIDP ? Has tried K , mag, calcium , Vit D   History not typical of PAD,        There are no Patient Instructions on file for this visit. Follow-up Disposition: Not on File    Thank you for allowing me to participate in the care of this patient.     Anisa Arevalo MD

## 2018-09-17 NOTE — MR AVS SNAPSHOT
49 [facility] [address] 
[phone] Patient: Rochelle Yeung MRN: [id number] OOM:5[birth year]10[birth year] Visit Information Date & Time Provider Department Dept. Phone Encounter #  
 9/17/2018  9:45 AM Kylie Phoenix MD [facility] [phone] [id number] Follow-up Instructions Return in about 3 months (around 12/17/2018). Upcoming Health Maintenance Date Due Hepatitis C Screening 1950 EYE EXAM RETINAL OR DILATED Q1 4/19/1962 DTaP/Tdap/Td series (1 - Tdap) 4/19/1973 FOBT Q 1 YEAR AGE 50-75 4/19/2002 ZOSTER VACCINE AGE 60> 2/19/2012 GLAUCOMA SCREENING Q2Y 4/19/2017 Pneumococcal 65+ Low/Medium Risk (1 of 2 - PCV13) 4/19/2017 MEDICARE YEARLY EXAM 3/14/2018 Influenza Age 5 to Adult 8/1/2018 HEMOGLOBIN A1C Q6M 3/10/2019 FOOT EXAM Q1 3/15/2019 MICROALBUMIN Q1 9/10/2019 LIPID PANEL Q1 9/10/2019 Allergies as of 9/17/2018  Review Complete On: 9/17/2018 By: Kylie Phoenix MD  
 No Known Allergies Current Immunizations  Never Reviewed No immunizations on file. Not reviewed this visit You Were Diagnosed With   
  
 Codes Comments Type 2 diabetes mellitus with hyperglycemia, without long-term current use of insulin (HCC)    -  Primary ICD-10-CM: E11.65 ICD-9-CM: 250.00, 790.29 Essential hypertension     ICD-10-CM: I10 
ICD-9-CM: 401.9 Mixed hyperlipidemia     ICD-10-CM: E78.2 ICD-9-CM: 272.2 Vitals BP Pulse Temp Resp Height(growth percentile) Weight(growth percentile) (!) 145/95 (BP 1 Location: Right arm, BP Patient Position: Sitting) 87 95.9 °F (35.5 °C) (Oral) 16 6' 4\" (1.93 m) 261 lb (118.4 kg) SpO2 BMI Smoking Status 95% 31.77 kg/m2 Former Smoker Vitals History BMI and BSA Data Body Mass Index Body Surface Area 31.77 kg/m 2 2.52 m 2 Preferred Pharmacy Pharmacy Name Phone [facility] [address] [place] [address],  Conchis Mckinley [address] [place] [address] [phone] Your Updated Medication List  
  
   
This list is accurate as of 9/17/18 10:32 AM.  Always use your most recent med list. amLODIPine 5 mg tablet Commonly known as:  Pablo Alstrom TAKE 1 TABLET BY MOUTH EVERY DAY  
  
 gabapentin 300 mg capsule Commonly known as:  NEURONTIN  
TAKE 2 CAPSULES BY MOUTH EVERY MORNING, 1 CAPSULE AT NOON, AND 2 CAPSULES EVERY EVENING  
  
 levomefolate-B6-meB12-algal oil 3 mg-35 mg-2 mg -90.314 mg Cap Commonly known as:  METANX (ALGAL OIL) Take 1 Cap by mouth two (2) times a day. losartan 100 mg tablet Commonly known as:  COZAAR Take 1 Tab by mouth daily. STOP VALSARTAN  
  
 metFORMIN 1,000 mg tablet Commonly known as:  GLUCOPHAGE  
TAKE 1 TABLET BY MOUTH TWICE DAILY pravastatin 10 mg tablet Commonly known as:  PRAVACHOL  
TAKE 1 TABLET BY MOUTH EVERY NIGHT FOR HIGH CHOLESTEROL Follow-up Instructions Return in about 3 months (around 12/17/2018). Introducing [facility]! Justine Dennis introduces Telepathy patient portal. Now you can access parts of your medical record, email your doctor's office, and request medication refills online. 1. In your internet browser, go to [email or web address] 2. Click on the First Time User? Click Here link in the Sign In box. You will see the New Member Sign Up page. 3. Enter your Telepathy Access Code exactly as it appears below. You will not need to use this code after youve completed the sign-up process. If you do not sign up before the expiration date, you must request a new code. · Telepathy Access Code: [email or web address] Expires: 12/16/2018 10:32 AM 
 
4. Enter the last four digits of your Social Security Number (xxxx) and Date of Birth (mm/dd/yyyy) as indicated and click Submit. You will be taken to the next sign-up page. 5. Create a [employer] ID. This will be your [employer] login ID and cannot be changed, so think of one that is secure and easy to remember. 6. Create a [employer] password. You can change your password at any time. 7. Enter your Password Reset Question and Answer. This can be used at a later time if you forget your password. 8. Enter your e-mail address. You will receive e-mail notification when new information is available in [address] 9. Click Sign Up. You can now view and download portions of your medical record. 10. Click the Download Summary menu link to download a portable copy of your medical information. If you have questions, please visit the Frequently Asked Questions section of the [employer] website. Remember, [employer] is NOT to be used for urgent needs. For medical emergencies, dial 911. Now available from your iPhone and Android! Please provide this summary of care documentation to your next provider. Your primary care clinician is listed as Alfredo Ahumada. If you have any questions after today's visit, please call [phone].

## 2018-11-26 DIAGNOSIS — I10 ESSENTIAL HYPERTENSION: ICD-10-CM

## 2018-11-26 RX ORDER — LOSARTAN POTASSIUM 100 MG/1
100 TABLET ORAL DAILY
Qty: 90 TAB | Refills: 3 | Status: SHIPPED | OUTPATIENT
Start: 2018-11-26 | End: 2020-02-07

## 2018-12-14 DIAGNOSIS — E78.5 HYPERLIPIDEMIA, UNSPECIFIED HYPERLIPIDEMIA TYPE: ICD-10-CM

## 2018-12-14 RX ORDER — EZETIMIBE 10 MG/1
10 TABLET ORAL
Qty: 90 TAB | Refills: 3 | Status: SHIPPED | OUTPATIENT
Start: 2018-12-14 | End: 2019-11-04 | Stop reason: SDUPTHER

## 2019-05-29 RX ORDER — METFORMIN HYDROCHLORIDE 1000 MG/1
TABLET ORAL
Qty: 180 TAB | Refills: 2 | Status: SHIPPED | OUTPATIENT
Start: 2019-05-29

## 2019-10-29 DIAGNOSIS — I10 ESSENTIAL HYPERTENSION: ICD-10-CM

## 2019-10-29 RX ORDER — LOSARTAN POTASSIUM 100 MG/1
100 TABLET ORAL DAILY
Qty: 90 TAB | Refills: 3 | OUTPATIENT
Start: 2019-10-29

## 2019-10-29 NOTE — TELEPHONE ENCOUNTER
----- Message from Yaya Barth sent at 10/29/2019 12:51 PM EDT -----  Regarding: Dr. Chantal Jarvis / Telephone  Contact: [phone]  Caller's first and last name: N/A  Reason for call: Pt. was informed by his pharmacy that his prescription for Losartan Potassium was denied. Pt. is requesting a callback to understand why this medication was denied.   Callback required yes/no and why: Yes  Best contact number(s):  [phone]  Fax Number:  Details to clarify the request:

## 2019-10-29 NOTE — TELEPHONE ENCOUNTER
Informed pt that he has not been seen in over a year ad that is likely why the rx was denied.  Pt will come in on 11/4/19 at 2:15 pm.

## 2019-11-04 ENCOUNTER — HOSPITAL ENCOUNTER (OUTPATIENT)
Dept: LAB | Age: 69
Discharge: HOME | End: 2019-11-04
Payer: MEDICARE

## 2019-11-04 ENCOUNTER — OFFICE VISIT (OUTPATIENT)
Dept: ENDOCRINOLOGY | Age: 69
End: 2019-11-04

## 2019-11-04 VITALS
BODY MASS INDEX: 32.27 KG/M2 | OXYGEN SATURATION: 92 % | HEART RATE: 85 BPM | DIASTOLIC BLOOD PRESSURE: 76 MMHG | RESPIRATION RATE: 16 BRPM | TEMPERATURE: 96.2 F | WEIGHT: 265 LBS | SYSTOLIC BLOOD PRESSURE: 133 MMHG | HEIGHT: 76 IN

## 2019-11-04 DIAGNOSIS — I10 ESSENTIAL HYPERTENSION: ICD-10-CM

## 2019-11-04 DIAGNOSIS — E11.21 TYPE 2 DIABETES WITH NEPHROPATHY (HCC): ICD-10-CM

## 2019-11-04 DIAGNOSIS — E11.21 TYPE 2 DIABETES WITH NEPHROPATHY (HCC): Primary | ICD-10-CM

## 2019-11-04 DIAGNOSIS — E78.5 HYPERLIPIDEMIA, UNSPECIFIED HYPERLIPIDEMIA TYPE: ICD-10-CM

## 2019-11-04 DIAGNOSIS — E78.2 MIXED HYPERLIPIDEMIA: ICD-10-CM

## 2019-11-04 LAB
ANION GAP SERPL CALC-SCNC: 6 MMOL/L (ref 5–15)
BUN SERPL-MCNC: 21 MG/DL (ref 6–20)
BUN/CREAT SERPL: 22 (ref 12–20)
CALCIUM SERPL-MCNC: 10.9 MG/DL (ref 8.5–10.1)
CHLORIDE SERPL-SCNC: 96 MMOL/L (ref 97–108)
CO2 SERPL-SCNC: 32 MMOL/L (ref 21–32)
CREAT SERPL-MCNC: 0.94 MG/DL (ref 0.7–1.3)
CREAT UR-MCNC: 87.1 MG/DL
GLUCOSE SERPL-MCNC: 141 MG/DL (ref 65–100)
HBA1C MFR BLD HPLC: 6.5 %
LDLC SERPL DIRECT ASSAY-MCNC: 164 MG/DL (ref 0–100)
MICROALBUMIN UR-MCNC: 4.52 MG/DL
MICROALBUMIN/CREAT UR-RTO: 52 MG/G (ref 0–30)
POTASSIUM SERPL-SCNC: 4.4 MMOL/L (ref 3.5–5.1)
SODIUM SERPL-SCNC: 134 MMOL/L (ref 136–145)

## 2019-11-04 PROCEDURE — 83721 ASSAY OF BLOOD LIPOPROTEIN: CPT

## 2019-11-04 PROCEDURE — 80048 BASIC METABOLIC PNL TOTAL CA: CPT

## 2019-11-04 PROCEDURE — 82043 UR ALBUMIN QUANTITATIVE: CPT

## 2019-11-04 RX ORDER — CARVEDILOL 6.25 MG/1
TABLET ORAL
Refills: 1 | COMMUNITY
Start: 2019-10-29

## 2019-11-04 RX ORDER — INDAPAMIDE 2.5 MG/1
TABLET, FILM COATED ORAL
Refills: 1 | COMMUNITY
Start: 2019-10-21

## 2019-11-04 RX ORDER — EZETIMIBE 10 MG/1
10 TABLET ORAL
Qty: 90 TAB | Refills: 3 | Status: SHIPPED | OUTPATIENT
Start: 2019-11-04

## 2019-11-04 RX ORDER — ALBUTEROL SULFATE 90 UG/1
AEROSOL, METERED RESPIRATORY (INHALATION)
COMMUNITY
Start: 2019-11-03

## 2019-11-04 NOTE — LETTER
11/5/19 Patient: Naheed Greer YOB: 1950 Date of Visit: 11/4/2019 Adelbert Phalen, MD 
[facility] [place] [address] VIA Facsimile: [phone] Dear Adelbert Phalen, MD, Thank you for referring Mr. Georgina Cisse to [address] [place] [address] for evaluation. My notes for this consultation are attached. If you have questions, please do not hesitate to call me. I look forward to following your patient along with you. Sincerely, Sarai Longo MD

## 2019-11-04 NOTE — PROGRESS NOTES
Josemanuel Rangel is a 81 y.o. male here for   Chief Complaint   Patient presents with    Diabetes     LV 9/2018    Diabetic Foot Exam       Functional glucose monitor and record keeping system? -not checking  Eye exam within last year? - on file  Foot exam within last year? - due    1. Have you been to the ER, urgent care clinic since your last visit? Hospitalized since your last visit? -no    2. Have you seen or consulted any other health care providers outside of the [address] [place] [address] since your last visit?   Include any pap smears or colon screening.-Patient First

## 2019-11-04 NOTE — PROGRESS NOTES
Raj Garvin MD           Patient Information  Date:11/4/2019  Name : Roland Leal 81 y.o.     YOB: 1950             Chief Complaint   Patient presents with    Diabetes     LV 9/2018    Diabetic Foot Exam       History of Present Illness: Roland Leal is a 81 y.o. male here for  Fu  of  Type 2 Diabetes Mellitus. He was seen a year ago  Diabetes: He is on metformin, Trulicity was expensive and hence he has stopped it. He has gained weight after stopping Trulicity  Not able to do much activity because of the CIDP  History of statin intolerance, LDL is high    He is getting IVIG now every 3 weeks    Has not noticed significant hypoglycemia   He is compliant with the medications  Metanx did not help      Self-referred for evaluation of diabetes and neuropathy. He was diagnosed with diabetes six years ago. Had  Uncontrolled DM in the past .  In February 2015, he noticed sudden muscle weakness, muscle pain and inability to move. He was on statins at that time which was discontinued ,  there was no difference after discontinuing statins. He had extensive workup including autoimmune etiology, SPEP, TSH, B-12, connective tissue disorders which were all negative. M-spike was elevated, evaluated by Dr. Amna Patel and repeat protein electrophoresis was negative, bone scan did not show any lytic lesions.       He went to [facility] and was dx with CIDP , on IV IG       Wt Readings from Last 3 Encounters:   11/04/19 265 lb (120.2 kg)   09/17/18 261 lb (118.4 kg)   03/15/18 259 lb (117.5 kg)       BP Readings from Last 3 Encounters:   11/04/19 133/76   09/17/18 (!) 145/95   03/15/18 (!) 145/97           Past Medical History:   Diagnosis Date    CIDP (chronic inflammatory demyelinating polyneuropathy) (HCC)     Diabetes (HCC)     Hypercholesteremia     Hypertension      Current Outpatient Medications   Medication Sig    albuterol (PROVENTIL HFA, VENTOLIN HFA, PROAIR HFA) 90 mcg/actuation inhaler     carvedilol (COREG) 6.25 mg tablet TAKE 1 TABLET BY MOUTH TWICE A DAY WITH FOOD    indapamide (LOZOL) 2.5 mg tablet TAKE 1 TABLET BY MOUTH EVERY DAY    metFORMIN (GLUCOPHAGE) 1,000 mg tablet TAKE 1 TABLET BY MOUTH TWICE A DAY    ezetimibe (ZETIA) 10 mg tablet Take 1 Tab by mouth nightly.  losartan (COZAAR) 100 mg tablet Take 1 Tab by mouth daily. STOP VALSARTAN    amLODIPine (NORVASC) 5 mg tablet TAKE 1 TABLET BY MOUTH EVERY DAY    pravastatin (PRAVACHOL) 10 mg tablet TAKE 1 TABLET BY MOUTH EVERY NIGHT FOR HIGH CHOLESTEROL    gabapentin (NEURONTIN) 300 mg capsule TAKE 2 CAPSULES BY MOUTH EVERY MORNING, 1 CAPSULE AT NOON, AND 2 CAPSULES EVERY EVENING (Patient taking differently: TAKE 2 CAPSULES BY MOUTH EVERY MORNING AND 2 CAPSULES EVERY EVENING)    levomefolate-B6-meB12-algal oil (METANX, ALGAL OIL,) 3 mg-35 mg-2 mg -90.314 mg cap Take 1 Cap by mouth two (2) times a day. No current facility-administered medications for this visit. No Known Allergies      Review of Systems:  - Constitutional Symptoms: no fevers, no chills  - Eyes: no blurry vision no double vision  - Cardiovascular: no chest pain ,no palpitations  - Respiratory: no cough no shortness of breath  - Gastrointestinal: no dysphagia no  abdominal pain  - Musculoskeletal:+ joint pains + weakness  - Integumentary: no rashes  - Neurological:+ numbness, tingling, no  headaches  -     Physical Examination:   Blood pressure 133/76, pulse 85, temperature 96.2 °F (35.7 °C), temperature source Oral, resp. rate 16, height 6' 4\" (1.93 m), weight 265 lb (120.2 kg), SpO2 92 %. Estimated body mass index is 32.26 kg/m² as calculated from the following:    Height as of this encounter: 6' 4\" (1.93 m). -   Weight as of this encounter: 265 lb (120.2 kg).   - General: pleasant, no distress, good eye contact  - HEENT: no pallor, no periorbital edema, EOMI  - Neck: supple, no thyromegaly, no nodules  - Cardiovascular: regular, normal rate, normal S1 and S2,  - Respiratory: clear to auscultation bilaterally  - Gastrointestinal: soft, nontender, nondistended,  BS +  - Musculoskeletal: no proximal weakness  - Psychiatric: normal mood and affect  - Skin: color, texture, turgor normal.     Diabetic foot exam: November 2019    Left:     Vibratory sensation absent   Filament test decreased sensation with micro filament   Pulse DP: On Dopplers pulse was detectable    Deformities: None  Right:    Vibratory sensation absent   Filament test decreased sensation with micro filament   Pulse DP: On Dopplers pulse was detectable   Deformities: None        Lab Results   Component Value Date/Time    Hemoglobin A1c 5.7 (H) 09/10/2018 01:23 PM    Hemoglobin A1c 5.9 (H) 10/05/2016 01:32 PM    Glucose 113 (H) 09/10/2018 01:23 PM    Glucose  03/15/2018 10:03 AM    Microalb/Creat ratio (ug/mg creat.) 30.9 (H) 09/10/2018 01:23 PM    LDL, calculated 160 (H) 09/10/2018 01:23 PM    Creatinine 0.92 09/10/2018 01:23 PM    Hemoglobin A1c, External 5.6 01/05/2017      Lab Results   Component Value Date/Time    Cholesterol, total 229 (H) 09/10/2018 01:23 PM    HDL Cholesterol 39 (L) 09/10/2018 01:23 PM    LDL, calculated 160 (H) 09/10/2018 01:23 PM    Triglyceride 148 09/10/2018 01:23 PM         Lab Results   Component Value Date/Time    ALT (SGPT) 29 09/10/2018 01:23 PM    AST (SGOT) 23 09/10/2018 01:23 PM    Alk. phosphatase 50 09/10/2018 01:23 PM    Bilirubin, total 0.4 09/10/2018 01:23 PM     Lab Results   Component Value Date/Time    GFR est  09/10/2018 01:23 PM    GFR est non-AA 86 09/10/2018 01:23 PM    Creatinine 0.92 09/10/2018 01:23 PM    BUN 11 09/10/2018 01:23 PM    Sodium 142 09/10/2018 01:23 PM    Potassium 5.0 09/10/2018 01:23 PM    Chloride 102 09/10/2018 01:23 PM    CO2 27 09/10/2018 01:23 PM      Lab Results   Component Value Date/Time    TSH 1.180 03/13/2015 10:11 AM        Assessment/Plan:     1.  Type 2 diabetes with nephropathy ([facility] [address]    2. Essential hypertension        1. Type 2 Diabetes Mellitus with neuropathy  Lab Results   Component Value Date/Time    Hemoglobin A1c 5.7 (H) 09/10/2018 01:23 PM    Hemoglobin A1c (POC) 6.5 11/04/2019 02:27 PM    Hemoglobin A1c, External 5.6 01/05/2017     Continue Metformin   Off Trulicity  Need low carb diet and lose weight     FLU annually ,Pneumovax ,aspirin daily,annual eye exam,microalbumin    2. HTN : Continue current therapy     3. Hyperlipidemia : Discussed about low-dose statin due to history of statin intolerance he is hesitant to start  Understands the benefits of statin  Zetia    4. Obesity:Body mass index is 32.26 kg/m². Discussed about the importance of exercise and carbohydrate portion control. 5. CIDP - On IV IG , once a month  Has M spike and  mild hypercalcemia, bone survey negative  Bone marrow bx was neg       6 Neuropathy - has tried gabapentin, Metanx, cramps related CIDP ? Has tried K , mag, calcium , Vit D          There are no Patient Instructions on file for this visit. Thank you for allowing me to participate in the care of this patient.     Debra Mcfarlane MD

## 2019-11-05 NOTE — PROGRESS NOTES
Calcium is high, need PTH, (additional test), he had this in 2016 and improved. But now it is high again. Cholesterol is also high. Avoid fried food and fatty food.

## 2019-11-06 ENCOUNTER — TELEPHONE (OUTPATIENT)
Dept: ENDOCRINOLOGY | Age: 69
End: 2019-11-06

## 2019-11-06 DIAGNOSIS — I10 ESSENTIAL HYPERTENSION: ICD-10-CM

## 2019-11-06 DIAGNOSIS — E83.52 HYPERCALCEMIA: ICD-10-CM

## 2019-11-06 DIAGNOSIS — E11.21 TYPE 2 DIABETES WITH NEPHROPATHY (HCC): Primary | ICD-10-CM

## 2019-11-06 NOTE — TELEPHONE ENCOUNTER
----- Message from Li Sánchez MD sent at 11/5/2019  8:26 AM EST -----  Need PTH, vitamin D, ionized calcium, 24-hour urine calcium, creatinine for high calcium work-up.

## 2019-11-06 NOTE — TELEPHONE ENCOUNTER
Per Dr. Barb Ching, informed pt of result note, as noted above. Pt verbalized understanding and will come by to  lab slips. No further questions or concerns at this time.     Order placed for pt per verbal order with read back from Dr. Barb Ching 11/06/19

## 2019-11-06 NOTE — TELEPHONE ENCOUNTER
----- Message from Constance Downs MD sent at 11/5/2019  8:26 AM EST -----  Calcium is high, need PTH, (additional test), he had this in 2016 and improved. But now it is high again. Cholesterol is also high. Avoid fried food and fatty food.

## 2019-11-11 ENCOUNTER — TELEPHONE (OUTPATIENT)
Dept: ENDOCRINOLOGY | Age: 69
End: 2019-11-11

## 2019-11-11 NOTE — TELEPHONE ENCOUNTER
Informed pt's wife of previously noted result note. She verbalized understanding and stated she saw the results in [address] She stated pt is having difficulty with walking and mobility. He's going to see his neurologist. Informed her I will mail everything to them so they won't have to come by here to get it. Address confirmed. No further questions or concerns at this time. Lab slips and instructions mailed.

## 2019-11-11 NOTE — TELEPHONE ENCOUNTER
Please call patients wife with lab results he is having some mobility issues. [phone].  Thank you

## 2020-02-07 DIAGNOSIS — I10 ESSENTIAL HYPERTENSION: ICD-10-CM

## 2020-02-07 RX ORDER — LOSARTAN POTASSIUM 100 MG/1
100 TABLET ORAL DAILY
Qty: 90 TAB | Refills: 3 | Status: SHIPPED | OUTPATIENT
Start: 2020-02-07

## 2022-03-18 PROBLEM — E11.21 TYPE 2 DIABETES WITH NEPHROPATHY (HCC): Status: ACTIVE | Noted: 2018-09-17

## 2023-05-24 RX ORDER — CARVEDILOL 6.25 MG/1
1 TABLET ORAL 2 TIMES DAILY WITH MEALS
COMMUNITY
Start: 2019-10-29

## 2023-05-24 RX ORDER — EZETIMIBE 10 MG/1
10 TABLET ORAL
COMMUNITY
Start: 2019-11-04

## 2023-05-24 RX ORDER — INDAPAMIDE 2.5 MG/1
1 TABLET, FILM COATED ORAL DAILY
COMMUNITY
Start: 2019-10-21

## 2023-05-24 RX ORDER — ALBUTEROL SULFATE 90 UG/1
AEROSOL, METERED RESPIRATORY (INHALATION)
COMMUNITY
Start: 2019-11-03

## 2023-05-24 RX ORDER — LOSARTAN POTASSIUM 100 MG/1
100 TABLET ORAL DAILY
COMMUNITY
Start: 2020-02-07